# Patient Record
Sex: FEMALE | Race: WHITE | Employment: FULL TIME | URBAN - METROPOLITAN AREA
[De-identification: names, ages, dates, MRNs, and addresses within clinical notes are randomized per-mention and may not be internally consistent; named-entity substitution may affect disease eponyms.]

---

## 2020-08-26 ENCOUNTER — EMPLOYEE WELLNESS (OUTPATIENT)
Dept: OTHER | Age: 30
End: 2020-08-26

## 2020-08-26 LAB
CHOLESTEROL, TOTAL: 215 MG/DL (ref 0–199)
GLUCOSE BLD-MCNC: 76 MG/DL (ref 74–107)
HDLC SERPL-MCNC: 62 MG/DL
LDL CHOLESTEROL CALCULATED: 141 MG/DL (ref 0–99)
TRIGL SERPL-MCNC: 61 MG/DL (ref 0–149)

## 2020-10-19 VITALS — WEIGHT: 157 LBS

## 2021-01-04 ENCOUNTER — OFFICE VISIT (OUTPATIENT)
Dept: FAMILY MEDICINE CLINIC | Age: 31
End: 2021-01-04
Payer: COMMERCIAL

## 2021-01-04 VITALS
RESPIRATION RATE: 16 BRPM | HEIGHT: 65 IN | DIASTOLIC BLOOD PRESSURE: 72 MMHG | TEMPERATURE: 98.4 F | SYSTOLIC BLOOD PRESSURE: 114 MMHG | WEIGHT: 166.8 LBS | BODY MASS INDEX: 27.79 KG/M2 | HEART RATE: 81 BPM | OXYGEN SATURATION: 98 %

## 2021-01-04 DIAGNOSIS — F32.A DEPRESSION, UNSPECIFIED DEPRESSION TYPE: Primary | ICD-10-CM

## 2021-01-04 DIAGNOSIS — M25.541 ARTHRALGIA OF BOTH HANDS: ICD-10-CM

## 2021-01-04 DIAGNOSIS — M25.542 ARTHRALGIA OF BOTH HANDS: ICD-10-CM

## 2021-01-04 PROBLEM — F41.1 GAD (GENERALIZED ANXIETY DISORDER): Status: ACTIVE | Noted: 2019-07-17

## 2021-01-04 PROBLEM — Z91.040 LATEX ALLERGY STATUS: Status: ACTIVE | Noted: 2020-04-28

## 2021-01-04 PROCEDURE — 99203 OFFICE O/P NEW LOW 30 MIN: CPT | Performed by: INTERNAL MEDICINE

## 2021-01-04 RX ORDER — DESVENLAFAXINE 50 MG/1
50 TABLET, EXTENDED RELEASE ORAL DAILY
COMMUNITY
Start: 2020-05-06 | End: 2021-01-04 | Stop reason: SDUPTHER

## 2021-01-04 RX ORDER — ALBUTEROL SULFATE 90 UG/1
2 AEROSOL, METERED RESPIRATORY (INHALATION) EVERY 6 HOURS PRN
COMMUNITY
End: 2022-01-21

## 2021-01-04 RX ORDER — DESVENLAFAXINE 50 MG/1
50 TABLET, EXTENDED RELEASE ORAL DAILY
Qty: 30 TABLET | Refills: 11 | Status: SHIPPED
Start: 2021-01-04 | End: 2022-01-13

## 2021-01-04 ASSESSMENT — PATIENT HEALTH QUESTIONNAIRE - PHQ9
SUM OF ALL RESPONSES TO PHQ QUESTIONS 1-9: 0
SUM OF ALL RESPONSES TO PHQ9 QUESTIONS 1 & 2: 0
SUM OF ALL RESPONSES TO PHQ QUESTIONS 1-9: 0
2. FEELING DOWN, DEPRESSED OR HOPELESS: 0
1. LITTLE INTEREST OR PLEASURE IN DOING THINGS: 0

## 2021-01-04 NOTE — PROGRESS NOTES
[facility]  [address] [place] [id number]  Dept: [phone]  Dept Fax: [phone]     NAME: Markel Bryan        :  1990        MRN:  <[id number]>    Chief Complaint   Patient presents with   Radha Teixeira New Patient     needing new Rx for Pristiq, referral for rheumatologist       Subjective     History of Present Illness  Markel Bryan is a 35 y.o. female who presents today to [facility] care. Patient reports having bilateral 1st -4th digit MCP pain and swelling intermittently. She also describes plaque like lesion to the palmar surface of the hand at the MCP as well. Currently she is not having any of these symptoms but she is concerned for possible psoriatic arthritis as this runs in her family in bother her mother and sister. Review of Systems  Please see HPI above. All bolded are positive. Gen: fever, chills, fatigue, weakness, diaphoresis, or unintentional weight change  Head: headache, vision change, hearing loss  Chest: chest pain/heaviness, palpitations  Lungs: shortness of breath, wheezing, coughing, hemoptysis  Abdomen: abdominal pain, nausea, vomiting, diarrhea, constipation, melena, hematochezia, hematemesis, or loss of appetite  Extremities: lower extremity edema, myalgias, arthralgias  Urinary: dysuria, hematuria, weak flow, or increase in frequency  Neurologic: lightheadedness, dizziness, confusion, syncope  Endocrine: polydipsia, polyuria, heat or cold intolerance  Psychiatric: depression, suicidal ideation, or anxiety  Derm: Rashes, ulcers, burns    Past Medical Hx:  No past medical history on file. Past Surgical Hx:  No past surgical history on file. Family Hx:  No family history on file.     Social Hx:  Social History     Tobacco Use    Smoking status: Never Smoker    Smokeless tobacco: Never Used   Substance Use Topics    Alcohol use: Not Currently       Home Medications:  Current Outpatient Medications Medication Sig Dispense Refill    albuterol sulfate  (90 Base) MCG/ACT inhaler Inhale 2 puffs into the lungs every 6 hours as needed      desvenlafaxine succinate (PRISTIQ) 50 MG TB24 extended release tablet Take 1 tablet by mouth daily 30 tablet 11     No current facility-administered medications for this visit. Allergies: Allergies   Allergen Reactions    Latex Hives and Nausea And Vomiting    Aspirin        Objective     Vitals:    01/04/21 1307   BP: 114/72   Pulse: 81   Resp: 16   Temp: 98.4 °F (36.9 °C)   SpO2: 98%       Physical Exam  General: Awake, alert, and oriented to person, place, time, and purpose, appears stated age and cooperative, No acute distress  Head: Normocephalic, atraumatic  Eyes: conjunctivae/corneas clear. PERRL, EOM's intact. Mouth: Mucous membranes moist with no pharyngeal exudate or erythema  Neck: no JVD, no adenopathy, no carotid bruit and supple, symmetrical, trachea midline  Back: symmetric, ROM normal, No CVA tenderness. Lungs: clear to auscultation bilaterally without wheezes, rales, or rhonchi  Heart: regular rate and rhythm, S1, S2 normal, no murmur, click, rub or gallop  Abdomen: soft, non-tender; bowel sounds normal; no masses,  no organomegaly  Extremities: atraumatic, no cyanosis, no edema, 2+ pulses palpated in all 4 extremities, to psoriasis plaque present, no tender or boggy joints present. Skin: color, texture, turgor within normal limits. No rashes or lesions or normal  Neurologic:speech appropriate, moves all 4 extremities, normal muscle strength and tone, CN 2-12 grossly intact    Labs:  No results found for: WBC, HGB, HCT, PLT, NA, K, CL, CREATININE, BUN, CO2, GLUCOSE, ALT, AST, INR  No results found for: TSH  No results found for: TRIG  No results found for: HDL  No results found for: LDLCALC  No results found for: LABA1C  No results found for: INR, PROTIME   *All recent labs were reviewed.  Please see electronic chart for a more comprehensive set of labs    Radiology:  No results found. Assessment and Plan     Patient is a 35 y.o. female who presented to the office to establish care. Full problem list is as follows: There is no problem list on file for this patient. Marva Thompson was seen today for new patient. Diagnoses and all orders for this visit:    Depression, unspecified depression type  -     desvenlafaxine succinate (PRISTIQ) 50 MG TB24 extended release tablet; Take 1 tablet by mouth daily    Arthralgia of both hands  -     C-Reactive Protein; Future  -     Rheumatoid Factor; Future  -     Cyclic Citrul Peptide Antibody, IgG; Future  -     C3 Complement; Future  -     C4 Complement; Future  -     Anti-DNA Antibody, Double-Stranded; Future  -     AGNIESZKA; Future  -     SEDIMENTATION RATE; Future        Educational materials and/or home exercises printed for patient's review and were included in patient instructions on his/her After Visit Summary and given to patient at the end of visit. Counseled regarding above diagnosis, including possible risks and complications,  especially if left uncontrolled. Counseled regarding the possible side effects, risks, benefits and alternatives to treatment; patient and/or guardian verbalizes understanding, agrees,feels comfortable with and wishes to proceed with above treatment plan. Advised patient to call Mariel Simmonds new medication issues, and read all Rx info from pharmacy to assure aware of all possible risks and side effects of any medication before taking. Reviewed age and gender appropriate health screening exams and vaccinations. Advised patient regarding importance of keeping up with recommended health maintenance and to schedule as soon as possible if overdue, as this is important in assessing for undiagnosed pathology, especially cancer, as well as protecting against potentially harmful/life threatening disease.        Patient verbalizes understanding and agrees with above counseling, assessment and plan. All questions answered.     Cameron Hudson DO   1/4/2021  2:33 PM

## 2021-09-15 LAB
CHOLESTEROL, TOTAL: 242 MG/DL (ref 0–199)
GLUCOSE BLD-MCNC: 83 MG/DL (ref 74–107)
HDLC SERPL-MCNC: 52 MG/DL
LDL CHOLESTEROL CALCULATED: 173 MG/DL (ref 0–99)
TRIGL SERPL-MCNC: 86 MG/DL (ref 0–149)

## 2022-01-12 DIAGNOSIS — F32.A DEPRESSION, UNSPECIFIED DEPRESSION TYPE: ICD-10-CM

## 2022-01-12 NOTE — TELEPHONE ENCOUNTER
Last Appointment:  1/4/2021  Future Appointments   Date Time Provider [place] Hart   1/21/2022 11:00 AM Jennifer Anderson  [address]

## 2022-01-13 RX ORDER — DESVENLAFAXINE 50 MG/1
TABLET, EXTENDED RELEASE ORAL
Qty: 30 TABLET | Refills: 11 | Status: SHIPPED
Start: 2022-01-13 | End: 2022-01-21 | Stop reason: SDUPTHER

## 2022-01-21 ENCOUNTER — OFFICE VISIT (OUTPATIENT)
Dept: FAMILY MEDICINE CLINIC | Age: 32
End: 2022-01-21
Payer: COMMERCIAL

## 2022-01-21 VITALS
RESPIRATION RATE: 18 BRPM | HEART RATE: 69 BPM | BODY MASS INDEX: 28.66 KG/M2 | HEIGHT: 65 IN | TEMPERATURE: 97.7 F | SYSTOLIC BLOOD PRESSURE: 110 MMHG | WEIGHT: 172 LBS | OXYGEN SATURATION: 99 % | DIASTOLIC BLOOD PRESSURE: 66 MMHG

## 2022-01-21 DIAGNOSIS — F32.A DEPRESSION, UNSPECIFIED DEPRESSION TYPE: Primary | ICD-10-CM

## 2022-01-21 DIAGNOSIS — J45.990 EXERCISE-INDUCED ASTHMA: ICD-10-CM

## 2022-01-21 DIAGNOSIS — L98.9 FOOT LESION: ICD-10-CM

## 2022-01-21 DIAGNOSIS — Z00.00 ENCOUNTER FOR WELL ADULT EXAM WITHOUT ABNORMAL FINDINGS: ICD-10-CM

## 2022-01-21 PROCEDURE — 99395 PREV VISIT EST AGE 18-39: CPT | Performed by: INTERNAL MEDICINE

## 2022-01-21 RX ORDER — ALBUTEROL SULFATE 90 UG/1
2 AEROSOL, METERED RESPIRATORY (INHALATION) EVERY 6 HOURS PRN
Qty: 1 EACH | Refills: 5 | Status: SHIPPED | OUTPATIENT
Start: 2022-01-21

## 2022-01-21 RX ORDER — ALBUTEROL SULFATE 90 UG/1
2 AEROSOL, METERED RESPIRATORY (INHALATION) EVERY 6 HOURS PRN
Qty: 18 G | Refills: 3 | Status: CANCELLED | OUTPATIENT
Start: 2022-01-21

## 2022-01-21 RX ORDER — DESVENLAFAXINE 50 MG/1
TABLET, EXTENDED RELEASE ORAL
Qty: 30 TABLET | Refills: 11 | Status: SHIPPED | OUTPATIENT
Start: 2022-01-21

## 2022-01-21 SDOH — ECONOMIC STABILITY: FOOD INSECURITY: WITHIN THE PAST 12 MONTHS, THE FOOD YOU BOUGHT JUST DIDN'T LAST AND YOU DIDN'T HAVE MONEY TO GET MORE.: NEVER TRUE

## 2022-01-21 SDOH — ECONOMIC STABILITY: FOOD INSECURITY: WITHIN THE PAST 12 MONTHS, YOU WORRIED THAT YOUR FOOD WOULD RUN OUT BEFORE YOU GOT MONEY TO BUY MORE.: NEVER TRUE

## 2022-01-21 ASSESSMENT — PATIENT HEALTH QUESTIONNAIRE - PHQ9
10. IF YOU CHECKED OFF ANY PROBLEMS, HOW DIFFICULT HAVE THESE PROBLEMS MADE IT FOR YOU TO DO YOUR WORK, TAKE CARE OF THINGS AT HOME, OR GET ALONG WITH OTHER PEOPLE: 0
SUM OF ALL RESPONSES TO PHQ9 QUESTIONS 1 & 2: 0
SUM OF ALL RESPONSES TO PHQ QUESTIONS 1-9: 0
6. FEELING BAD ABOUT YOURSELF - OR THAT YOU ARE A FAILURE OR HAVE LET YOURSELF OR YOUR FAMILY DOWN: 0
5. POOR APPETITE OR OVEREATING: 0
3. TROUBLE FALLING OR STAYING ASLEEP: 0
9. THOUGHTS THAT YOU WOULD BE BETTER OFF DEAD, OR OF HURTING YOURSELF: 0
2. FEELING DOWN, DEPRESSED OR HOPELESS: 0
SUM OF ALL RESPONSES TO PHQ QUESTIONS 1-9: 0
4. FEELING TIRED OR HAVING LITTLE ENERGY: 0
SUM OF ALL RESPONSES TO PHQ QUESTIONS 1-9: 0
SUM OF ALL RESPONSES TO PHQ QUESTIONS 1-9: 0
7. TROUBLE CONCENTRATING ON THINGS, SUCH AS READING THE NEWSPAPER OR WATCHING TELEVISION: 0
8. MOVING OR SPEAKING SO SLOWLY THAT OTHER PEOPLE COULD HAVE NOTICED. OR THE OPPOSITE, BEING SO FIGETY OR RESTLESS THAT YOU HAVE BEEN MOVING AROUND A LOT MORE THAN USUAL: 0
1. LITTLE INTEREST OR PLEASURE IN DOING THINGS: 0

## 2022-01-21 ASSESSMENT — SOCIAL DETERMINANTS OF HEALTH (SDOH): HOW HARD IS IT FOR YOU TO PAY FOR THE VERY BASICS LIKE FOOD, HOUSING, MEDICAL CARE, AND HEATING?: NOT HARD AT ALL

## 2022-01-21 NOTE — PATIENT INSTRUCTIONS
Well Visit, Ages 25 to 48: Care Instructions  Overview     Well visits can help you stay healthy. Your doctor has checked your overall health and may have suggested ways to take good care of yourself. Your doctor also may have recommended tests. At home, you can help prevent illness with healthy eating, regular exercise, and other steps. Follow-up care is a key part of your treatment and safety. Be sure to make and go to all appointments, and call your doctor if you are having problems. It's also a good idea to know your test results and keep a list of the medicines you take. How can you care for yourself at home? · Get screening tests that you and your doctor decide on. Screening helps find diseases before any symptoms appear. · Eat healthy foods. Choose fruits, vegetables, whole grains, protein, and low-fat dairy foods. Limit fat, especially saturated fat. Reduce salt in your diet. · Limit alcohol. If you are a man, have no more than 2 drinks a day or 14 drinks a week. If you are a woman, have no more than 1 drink a day or 7 drinks a week. · Get at least 30 minutes of physical activity on most days of the week. Walking is a good choice. You also may want to do other activities, such as running, swimming, cycling, or playing tennis or team sports. Discuss any changes in your exercise program with your doctor. · Reach and stay at a healthy weight. This will lower your risk for many problems, such as obesity, diabetes, heart disease, and high blood pressure. · Do not smoke or allow others to smoke around you. If you need help quitting, talk to your doctor about stop-smoking programs and medicines. These can increase your chances of quitting for good. · Care for your mental health. It is easy to get weighed down by worry and stress. Learn strategies to manage stress, like deep breathing and mindfulness, and stay connected with your family and community.  If you find you often feel sad or hopeless, talk with your doctor. Treatment can help. · Talk to your doctor about whether you have any risk factors for sexually transmitted infections (STIs). You can help prevent STIs if you wait to have sex with a new partner (or partners) until you've each been tested for STIs. It also helps if you use condoms (male or female condoms) and if you limit your sex partners to one person who only has sex with you. Vaccines are available for some STIs, such as HPV. · Use birth control if it's important to you to prevent pregnancy. Talk with your doctor about the choices available and what might be best for you. · If you think you may have a problem with alcohol or drug use, talk to your doctor. This includes prescription medicines (such as amphetamines and opioids) and illegal drugs (such as cocaine and methamphetamine). Your doctor can help you figure out what type of treatment is best for you. · Protect your skin from too much sun. When you're outdoors from 10 a.m. to 4 p.m., stay in the shade or cover up with clothing and a hat with a wide brim. Wear sunglasses that block UV rays. Even when it's cloudy, put broad-spectrum sunscreen (SPF 30 or higher) on any exposed skin. · See a dentist one or two times a year for checkups and to have your teeth cleaned. · Wear a seat belt in the car. When should you call for help? Watch closely for changes in your health, and be sure to contact your doctor if you have any problems or symptoms that concern you. Where can you learn more? Go to https://[employer]. org and sign in to your [employer] account. Enter P072 in the [facility] box to learn more about \"Well Visit, Ages 25 to 48: Care Instructions. \"     If you do not have an account, please click on the \"Sign Up Now\" link. Current as of: October 6, 2021               Content Version: 13.1  © 5859-8138 Healthwise, Incorporated. Care instructions adapted under license by [facility] ([facility]).  If you have questions about a medical condition or this instruction, always ask your healthcare professional. [place] [address] any warranty or liability for your use of this information.

## 2022-01-21 NOTE — PROGRESS NOTES
Well Adult Note  Name: Anders Paulino Date: 2022   MRN: [id number] Sex: Female   Age: 28 y.o. Ethnicity: Non- / Non    : 1990 Race: White (non-)      Aye Agarwal is here for well adult exam.  History:  Patient is doing well on her current medications. She takes Prestiq daily and is well controlled on that. She uses the albuterol inhaler only occasionally, if exercise induces her asthma. She does have a wart on the bottom of her left foot that has been present for several years. It has been frozen, burned, and shaved off in the past with out permanent relief, as is always comes back. She denies any redness or irritation, but states that it is painful to walk on. Review of Systems  Please see HPI above. All bolded are positive. Gen: fever, chills, fatigue, weakness, diaphoresis, or unintentional weight change  Head: headache, vision change, hearing loss  Chest: chest pain/heaviness, palpitations  Lungs: shortness of breath, wheezing, coughing, hemoptysis  Abdomen: abdominal pain, nausea, vomiting, diarrhea, constipation, melena, hematochezia, hematemesis, or loss of appetite  Extremities: lower extremity edema, myalgias, arthralgias  Urinary: dysuria, hematuria, weak flow, or increase in frequency  Neurologic: lightheadedness, dizziness, confusion, syncope  Endocrine: polydipsia, polyuria, heat or cold intolerance  Psychiatric: depression, suicidal ideation, or anxiety  Derm: Rashes, ulcers, burns        Allergies   Allergen Reactions    Latex Hives and Nausea And Vomiting    Aspirin          Prior to Visit Medications    Medication Sig Taking?  Authorizing Provider   desvenlafaxine succinate (PRISTIQ) 50 MG TB24 extended release tablet TAKE ONE TABLET BY MOUTH DAILY Yes Chary Aceves, DO   albuterol sulfate HFA (PROAIR HFA) 108 (90 Base) MCG/ACT inhaler Inhale 2 puffs into the lungs every 6 hours as needed for Wheezing or Shortness of Breath Yes Buster Fernandes DO       No past medical history on file. No past surgical history on file. No family history on file. Social History     Tobacco Use    Smoking status: Never Smoker    Smokeless tobacco: Never Used   Substance Use Topics    Alcohol use: Not Currently    Drug use: Never       Objective   /66   Pulse 69   Temp 97.7 °F (36.5 °C) (Temporal)   Resp 18   Ht 5' 5\" (1.651 m)   Wt 172 lb (78 kg)   SpO2 99%   BMI 28.62 kg/m²   Wt Readings from Last 3 Encounters:   01/21/22 172 lb (78 kg)   01/04/21 166 lb 12.8 oz (75.7 kg)   08/26/20 157 lb (71.2 kg)       Physical Exam:  General: Awake, alert, and oriented to person, place, time, and purpose, appears stated age and cooperative, No acute distress  Head: Normocephalic, atraumatic  Eyes: conjunctivae/corneas clear, EOM's intact. Mouth: Mucous membranes moist with no pharyngeal exudate or erythema  Neck: no JVD, no adenopathy, no carotid bruit, supple, symmetrical, trachea midline  Back: symmetric, ROM normal, No CVA tenderness. Lungs: clear to auscultation bilaterally without wheezes, rales, or rhonchi  Heart: regular rate and rhythm, S1, S2 normal, no murmur, click, rub or gallop  Abdomen: soft, non-tender; bowel sounds normal; no masses,  no organomegaly  Extremities: atraumatic, no cyanosis, no edema, 2+ pulses palpated in all 4 extremities  Skin: color, texture, turgor within normal limits. No rashes or lesions or normal  Wart with surrounding calloused area on the bottom of her left foot. Neurologic: speech appropriate, moves all 4 extremities, normal muscle strength and tone, CN 2-12 grossly intact      Assessment   Plan   1. Depression, unspecified depression type  -     desvenlafaxine succinate (PRISTIQ) 50 MG TB24 extended release tablet; TAKE ONE TABLET BY MOUTH DAILY, Disp-30 tablet, R-11Normal  2. Exercise-induced asthma  -     albuterol sulfate HFA (PROAIR HFA) 108 (90 Base) MCG/ACT inhaler;  Inhale 2 puffs into the lungs every 6 hours as needed for Wheezing or Shortness of Breath, Disp-1 each, R-5Normal  3. Foot lesion  -     Sadi Galindo, [place], Podiatry, [place]  4. Encounter for well adult exam without abnormal findings         Personalized Preventive Plan   Current Health Maintenance Status  Immunization History   Administered Date(s) Administered    COVID-19, Cleve Yanique, Primary or Immunocompromised, PF, 100mcg/0.5mL 02/26/2021, 03/31/2021    Influenza Virus Vaccine 10/27/2017, 10/04/2019, 11/18/2021    Influenza, Intradermal, Quadrivalent, Preservative Free 10/05/2020    MMR 12/02/2002, 03/14/2019    Tdap (Boostrix, Adacel) 03/14/2019    Varicella (Varivax) 03/25/2019        Health Maintenance   Topic Date Due    Hepatitis C screen  Never done    Depression Monitoring  Never done    HIV screen  Never done    Cervical cancer screen  Never done    COVID-19 Vaccine (3 - Booster for Moderna series) 09/30/2021    DTaP/Tdap/Td vaccine (2 - Td or Tdap) 03/14/2029    Flu vaccine  Completed    Hepatitis A vaccine  Aged Out    Hepatitis B vaccine  Aged Out    Hib vaccine  Aged Out    Meningococcal (ACWY) vaccine  Aged Out    Pneumococcal 0-64 years Vaccine  Aged Out    Varicella vaccine  Discontinued     Recommendations for Urlist Due: see orders and patient instructions/AVS.    No follow-ups on file.     Trudy Read, DO

## 2022-02-10 ENCOUNTER — OFFICE VISIT (OUTPATIENT)
Dept: PODIATRY | Age: 32
End: 2022-02-10
Payer: COMMERCIAL

## 2022-02-10 VITALS — WEIGHT: 165 LBS | HEIGHT: 65 IN | BODY MASS INDEX: 27.49 KG/M2

## 2022-02-10 DIAGNOSIS — D23.72 BENIGN NEOPLASM OF SKIN OF LEFT FOOT: Primary | ICD-10-CM

## 2022-02-10 DIAGNOSIS — M79.672 PAIN IN LEFT FOOT: ICD-10-CM

## 2022-02-10 DIAGNOSIS — R26.2 DIFFICULTY WALKING: ICD-10-CM

## 2022-02-10 PROCEDURE — 99203 OFFICE O/P NEW LOW 30 MIN: CPT | Performed by: PODIATRIST

## 2022-02-10 NOTE — PROGRESS NOTES
Patient is in today for evaluation of possibly wart to the left foot. Patient has tried to have it removed in the past, but it returned.  pcp is Lee Lew DO  Last ov 1/21/22

## 2022-02-10 NOTE — PROGRESS NOTES
2/10/22     Davide Patel    : 1990 Sex: female   Age: 28 y.o. Patient was referred by: Micaela Betts DO  Patient's PCP/Provider is:  Micaela Betts DO    Subjective:    Patient is seen today for evaluation regarding painful neoplasm left foot. Chief Complaint   Patient presents with    Foot Pain     left foot        HPI: Patient has had multiple treatments performed to the neoplasm site over the last several years. She has had cold therapy, cauterization, and topical treatments without improvement noted. Patient presented today to discuss further treatment options available. No other additional abnormalities noted. ROS:  Const: Positives and pertinent negatives as per HPI. Musculo: Denies symptoms other than stated above. Neuro: Denies symptoms other than stated above. Skin: Denies symptoms other than stated above. Current Medications:    Current Outpatient Medications:     desvenlafaxine succinate (PRISTIQ) 50 MG TB24 extended release tablet, TAKE ONE TABLET BY MOUTH DAILY, Disp: 30 tablet, Rfl: 11    albuterol sulfate HFA (PROAIR HFA) 108 (90 Base) MCG/ACT inhaler, Inhale 2 puffs into the lungs every 6 hours as needed for Wheezing or Shortness of Breath, Disp: 1 each, Rfl: 5    Allergies: Allergies   Allergen Reactions    Latex Hives and Nausea And Vomiting    Aspirin        Vitals:    02/10/22 1017   Weight: 165 lb (74.8 kg)   Height: 5' 5\" (1.651 m)        No past medical history on file. No family history on file. No past surgical history on file. Social History     Tobacco Use    Smoking status: Never Smoker    Smokeless tobacco: Never Used   Substance Use Topics    Alcohol use: Not Currently    Drug use: Never           Diagnostic studies:    No results found. Procedures:    None    Exam:  VASCULAR: Pedal pulses palpable left foot. Capillary refill time brisk digits 1 through 5 left foot.   NEUROLOGICAL: Epicritic sensations intact left lower

## 2022-02-10 NOTE — LETTER
[address] [place] [address] Deena Herman  Phone: [phone]  Fax: [phone]    Ciara Irvin  <[id number]>   1990  2/10/2022      Dear Preston Kirkland,    I would like to thank you for the kind referral of Batool Irvin. She presented to the office today for evaluation regarding chronic neoplasm left foot. Patient has had multiple treatments rendered in the recent past without improvement noted. We did discuss surgical excision of the neoplasm on an outpatient basis which patient was in favor of at this time. We will proceed with outpatient surgery in the near future. We will have continued follow-up until issues are resolved. If you should have any questions concerning her visit today, please do not hesitate to contact me.     Sincerely,    Hilary Fuller DPM

## 2022-04-27 ENCOUNTER — OFFICE VISIT (OUTPATIENT)
Dept: PODIATRY | Age: 32
End: 2022-04-27
Payer: COMMERCIAL

## 2022-04-27 VITALS — HEIGHT: 65 IN | WEIGHT: 165 LBS | BODY MASS INDEX: 27.49 KG/M2

## 2022-04-27 DIAGNOSIS — M79.672 PAIN IN LEFT FOOT: ICD-10-CM

## 2022-04-27 DIAGNOSIS — D23.72 BENIGN NEOPLASM OF SKIN OF LEFT FOOT: Primary | ICD-10-CM

## 2022-04-27 DIAGNOSIS — R26.2 DIFFICULTY WALKING: ICD-10-CM

## 2022-04-27 PROCEDURE — 99213 OFFICE O/P EST LOW 20 MIN: CPT | Performed by: PODIATRIST

## 2022-04-27 NOTE — PROGRESS NOTES
Patient is in today to discuss surgery for benign neoplasm left foot.  pcp is Gabino Lucero DO  Last ov 1/21/22

## 2022-04-27 NOTE — PROGRESS NOTES
22     Yoanna Reina    : 1990   Sex: female    Age: 33 y.o. Patient's PCP/Provider is:  Bry Ng DO    Subjective:  Patient is seen today for follow-up regarding continued evaluation regarding painful neoplasm left forefoot area. Patient presents today to discuss outpatient surgical intervention. She denies any additional issues at this time. Chief Complaint   Patient presents with    Follow-up     discuss surgery        ROS:  Const: Positives and pertinent negatives as per HPI. Musculo: Denies symptoms other than stated above. Neuro: Denies symptoms other than stated above. Skin: Denies symptoms other than stated above. Current Medications:    Current Outpatient Medications:     desvenlafaxine succinate (PRISTIQ) 50 MG TB24 extended release tablet, TAKE ONE TABLET BY MOUTH DAILY, Disp: 30 tablet, Rfl: 11    albuterol sulfate HFA (PROAIR HFA) 108 (90 Base) MCG/ACT inhaler, Inhale 2 puffs into the lungs every 6 hours as needed for Wheezing or Shortness of Breath, Disp: 1 each, Rfl: 5    Allergies: Allergies   Allergen Reactions    Latex Hives and Nausea And Vomiting    Aspirin        Vitals:    22 1604   Weight: 165 lb (74.8 kg)   Height: 5' 5\" (1.651 m)       Exam:  Neurovascular status unchanged. Neoplasm noted plantar left fourth MTPJ area measuring approximately 0.7 cm in diameter. Tenderness noted to direct palpation. No signs of infection noted left forefoot area. No maceration webspaces noted left foot. Adequate range of motion digital regions left foot. Diagnostic Studies:     No results found. Procedures:    None    Plan Per Assessment  Keke Barcenas was seen today for follow-up. 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 Evaluation and management  2. We did discuss outpatient surgical intervention with patient in detail today.   We will proceed with contain grammatical errors.

## 2022-04-28 ENCOUNTER — TELEPHONE (OUTPATIENT)
Dept: PODIATRY | Age: 32
End: 2022-04-28

## 2022-04-28 NOTE — TELEPHONE ENCOUNTER
Prior Authorization Form:      DEMOGRAPHICS:                     Patient Name:  Jose Samuels  Patient :  1990            Insurance:  Payor: Madyson Monsivaisvi RODRIGUEZadryanpoli 150 / Plan: Madyson Monsivaisjocelynsuresh MICHAELadryanpoli 150 - [place] PPO / Product Type: *No Product type* /   Insurance ID Number:    Payor/Plan Subscr  Sex Relation Sub. Ins. ID Effective Group Num   1.  [facility] 1990 Female Self [id number]                                    [id number]         DIAGNOSIS & PROCEDURE:                       Procedure/Operation: excision benign neoplasm left foot           CPT Code: 22571    Diagnosis:  Benign neoplasm left foot, pain in left foot    ICD10 Code: D23.72, M79.672     Location:  [facility]    Surgeon:  Dr. Jon Moore INFORMATION:                          Date: 22    Time: TBD              Anesthesia:  MAC/TIVA                                                       Status:  Outpatient        Special Comments:  None       Electronically signed by Bella Alvarez LPN on  at 5:58 AM

## 2022-05-11 ENCOUNTER — OFFICE VISIT (OUTPATIENT)
Dept: FAMILY MEDICINE CLINIC | Age: 32
End: 2022-05-11
Payer: COMMERCIAL

## 2022-05-11 VITALS
BODY MASS INDEX: 28.16 KG/M2 | RESPIRATION RATE: 18 BRPM | SYSTOLIC BLOOD PRESSURE: 110 MMHG | TEMPERATURE: 98.5 F | OXYGEN SATURATION: 98 % | WEIGHT: 169 LBS | HEIGHT: 65 IN | DIASTOLIC BLOOD PRESSURE: 66 MMHG | HEART RATE: 96 BPM

## 2022-05-11 DIAGNOSIS — F32.A DEPRESSION, UNSPECIFIED DEPRESSION TYPE: ICD-10-CM

## 2022-05-11 DIAGNOSIS — L98.9 FOOT LESION: ICD-10-CM

## 2022-05-11 DIAGNOSIS — Z01.818 PREOP EXAMINATION: Primary | ICD-10-CM

## 2022-05-11 DIAGNOSIS — J45.990 EXERCISE-INDUCED ASTHMA: ICD-10-CM

## 2022-05-11 PROCEDURE — 99213 OFFICE O/P EST LOW 20 MIN: CPT | Performed by: INTERNAL MEDICINE

## 2022-05-11 NOTE — PROGRESS NOTES
[facility]  [address] [place] [address]  [place] [address]  Dept: [phone]  Dept Fax: [phone]     NAME: Cherrie Muir        :  1990        MRN:  [id number]    Chief Complaint   Patient presents with    Pre-op Exam     Removal of wart Lt foot scheduled 22 with Dr. Missy Mcdonald / under twighlight       Subjective     History of Present Illness  Cherrie Muir is a 33 y.o. female who presents today for preop clearance. She is scheduled for the removal of a wart from her left foot on 2022 with Dr. Missy Mcdonald.  She will be under twilight anesthesia. Review of Systems  Please see HPI above. All bolded are positive. Gen: fever, chills, fatigue, weakness, diaphoresis, unintentional weight change  Head: headache, vision change, hearing loss  Chest: chest pain/heaviness, palpitations  Lungs: shortness of breath, wheezing, coughing, hemoptysis  Abdomen: abdominal pain, nausea, vomiting, diarrhea, constipation, melena, hematochezia, hematemesis, loss of appetite  Extremities: lower extremity edema, myalgias, arthralgias  Urinary: dysuria, hematuria, weak flow, increase in frequency  Neurologic: lightheadedness, dizziness, confusion, syncope  Endocrine: polydipsia, polyuria, heat or cold intolerance  Psychiatric: depression, suicidal ideation, anxiety  Derm: Rashes, ulcers, burns    Past Medical Hx:  No past medical history on file. Past Surgical Hx:  No past surgical history on file. Family Hx:  No family history on file.     Social Hx:  Social History     Tobacco Use    Smoking status: Never Smoker    Smokeless tobacco: Never Used   Substance Use Topics    Alcohol use: Not Currently       Home Medications:  Current Outpatient Medications   Medication Sig Dispense Refill    desvenlafaxine succinate (PRISTIQ) 50 MG TB24 extended release tablet TAKE ONE TABLET BY MOUTH DAILY 30 tablet 11    albuterol sulfate HFA (PROAIR HFA) 108 (90 Base) MCG/ACT inhaler Inhale 2 puffs into the lungs every 6 hours as needed for Wheezing or Shortness of Breath 1 each 5     No current facility-administered medications for this visit. Allergies: Allergies   Allergen Reactions    Latex Hives and Nausea And Vomiting    Aspirin        Objective     Vitals:    05/11/22 0926   BP: 110/66   Pulse: 96   Resp: 18   Temp: 98.5 °F (36.9 °C)   TempSrc: Temporal   SpO2: 98%   Weight: 169 lb (76.7 kg)   Height: 5' 5\" (1.651 m)        Physical Exam  General: Awake, alert, and oriented to person, place, time, and purpose, appears stated age and cooperative, No acute distress  Head: Normocephalic, atraumatic  Eyes: conjunctivae/corneas clear, EOMI  Mouth: Mucous membranes moist with no pharyngeal exudate or erythema  Neck: no JVD, no adenopathy, no carotid bruit, supple, symmetrical, trachea midline  Back: symmetric, ROM normal, No CVA tenderness. Lungs: clear to auscultation bilaterally without wheezes, rales, or rhonchi  Heart: regular rate and rhythm, S1, S2 normal, no murmur, click, rub or gallop  Abdomen: soft, non-tender; bowel sounds normal; no masses,  no organomegaly  Extremities: atraumatic, no cyanosis, no edema  Skin: color, texture, turgor within normal limits. No rashes or lesions   Neurologic:speech appropriate, moves all 4 extremities, normal muscle strength and tone, CN 2-12 grossly intact    Labs:  Lab Results   Component Value Date    GLUCOSE 83 09/15/2021     No results found for: TSH  Lab Results   Component Value Date    TRIG 86 09/15/2021    TRIG 61 08/26/2020     Lab Results   Component Value Date    HDL 52 09/15/2021    HDL 62 08/26/2020     Lab Results   Component Value Date    LDLCALC 173 (H) 09/15/2021    LDLCALC 141 (H) 08/26/2020     No results found for: LABA1C  No results found for: INR, PROTIME   *All recent labs were reviewed. Please see electronic chart for a more comprehensive set of labs    Radiology:  No results found.     Assessment and Plan     Patient is a 33 y.o. female who presented to the office for follow up. Full problem list is as follows:  Patient Active Problem List   Diagnosis    Latex allergy status    POLLY (generalized anxiety disorder)    Benign neoplasm of skin of left foot    Pain in left foot    Difficulty walking       Keke Pump was seen today for pre-op exam.    Diagnoses and all orders for this visit:    Preop examination    Exercise-induced asthma    Foot lesion    Depression, unspecified depression type    -All conditions listed above are stable    Patient is medically cleared for surgery at this time. Form was filled out and signed for her and faxed to Dr. Graciela Hauser office. Educational materials and/or home exercises printed for patient's review and were included in patient instructions on his/her After Visit Summary and given to patient at the end of visit. Counseled regarding above diagnosis, including possible risks and complications, especially if left uncontrolled. Counseled regarding the possible side effects, risks, benefits and alternatives to treatment; patient and/or guardian verbalizes understanding, agrees, feels comfortable with and wishes to proceed with above treatment plan. Advised patient to call Thao Slater new medication issues, and read all Rx info from pharmacy to assure aware of all possible risks and side effects of any medication before taking. Patient verbalizes understanding and agrees with above counseling, assessment and plan. All questions answered.     Bry Ng, DO

## 2022-05-18 ENCOUNTER — ANESTHESIA EVENT (OUTPATIENT)
Dept: OPERATING ROOM | Age: 32
End: 2022-05-18
Payer: COMMERCIAL

## 2022-05-18 ENCOUNTER — PREP FOR PROCEDURE (OUTPATIENT)
Dept: PODIATRY | Age: 32
End: 2022-05-18

## 2022-05-18 RX ORDER — SODIUM CHLORIDE 0.9 % (FLUSH) 0.9 %
5-40 SYRINGE (ML) INJECTION PRN
Status: CANCELLED | OUTPATIENT
Start: 2022-05-18

## 2022-05-18 RX ORDER — SODIUM CHLORIDE 9 MG/ML
INJECTION, SOLUTION INTRAVENOUS PRN
Status: CANCELLED | OUTPATIENT
Start: 2022-05-18

## 2022-05-18 RX ORDER — SODIUM CHLORIDE 0.9 % (FLUSH) 0.9 %
5-40 SYRINGE (ML) INJECTION EVERY 12 HOURS SCHEDULED
Status: CANCELLED | OUTPATIENT
Start: 2022-05-18

## 2022-05-18 NOTE — ANESTHESIA PRE PROCEDURE
Department of Anesthesiology  Preprocedure Note       Name:  Coretta Sheehan   Age:  33 y.o.  :  1990                                          MRN:  [id number]         Date:  2022      Surgeon: Johnathan Farley):  Matti Ramsey DPM    Procedure: Procedure(s):  EXCISION BENIGN NEOPLASM LEFT FOOT    Medications prior to admission:   Prior to Admission medications    Medication Sig Start Date End Date Taking? Authorizing Provider   desvenlafaxine succinate (PRISTIQ) 50 MG TB24 extended release tablet TAKE ONE TABLET BY MOUTH DAILY 22   Rinku Virgen DO   albuterol sulfate HFA (PROAIR HFA) 108 (90 Base) MCG/ACT inhaler Inhale 2 puffs into the lungs every 6 hours as needed for Wheezing or Shortness of Breath 22   Rinku Virgen DO       Current medications:    No current facility-administered medications for this encounter. Current Outpatient Medications   Medication Sig Dispense Refill    desvenlafaxine succinate (PRISTIQ) 50 MG TB24 extended release tablet TAKE ONE TABLET BY MOUTH DAILY 30 tablet 11    albuterol sulfate HFA (PROAIR HFA) 108 (90 Base) MCG/ACT inhaler Inhale 2 puffs into the lungs every 6 hours as needed for Wheezing or Shortness of Breath 1 each 5       Allergies:     Allergies   Allergen Reactions    Latex Hives and Nausea And Vomiting    Aspirin      Tachycardia, anxiety       Problem List:    Patient Active Problem List   Diagnosis Code    Latex allergy status Z91.040    POLLY (generalized anxiety disorder) F41.1    Benign neoplasm of skin of left foot D23.72    Pain in left foot M79.672    Difficulty walking R26.2       Past Medical History:        Diagnosis Date    Asthma     exercised induced    COVID-19     Dec 2020       Past Surgical History:        Procedure Laterality Date    TONSILLECTOMY         Social History:    Social History     Tobacco Use    Smoking status: Never Smoker    Smokeless tobacco: Never Used   Substance Use Topics    Alcohol use: Not Currently                                Counseling given: Not Answered      Vital Signs (Current):   Vitals:    05/16/22 1127   Weight: 165 lb (74.8 kg)   Height: 5' 5\" (1.651 m)                                              BP Readings from Last 3 Encounters:   05/11/22 110/66   01/21/22 110/66   01/04/21 114/72       NPO Status:  >8.H                                                                               BMI:   Wt Readings from Last 3 Encounters:   05/11/22 169 lb (76.7 kg)   04/27/22 165 lb (74.8 kg)   02/10/22 165 lb (74.8 kg)     Body mass index is 27.46 kg/m². CBC: No results found for: WBC, RBC, HGB, HCT, MCV, RDW, PLT    CMP:   Lab Results   Component Value Date    GLUCOSE 83 09/15/2021       POC Tests: No results for input(s): POCGLU, POCNA, POCK, POCCL, POCBUN, POCHEMO, POCHCT in the last 72 hours.     Coags: No results found for: PROTIME, INR, APTT    HCG (If Applicable): No results found for: PREGTESTUR, PREGSERUM, HCG, HCGQUANT     ABGs: No results found for: PHART, PO2ART, VDI2WYM, HGI6WJZ, BEART, J1BSLYBA     Type & Screen (If Applicable):  No results found for: LABABO, LABRH    Drug/Infectious Status (If Applicable):  No results found for: HIV, HEPCAB    COVID-19 Screening (If Applicable): No results found for: COVID19        Anesthesia Evaluation  Patient summary reviewed no history of anesthetic complications:   Airway: Mallampati: II  TM distance: >3 FB   Neck ROM: full  Mouth opening: > = 3 FB Dental: normal exam         Pulmonary: breath sounds clear to auscultation  (+) asthma: exercise-induced asthma,     (-) not a current smoker                           Cardiovascular:Negative CV ROS  Exercise tolerance: good (>4 METS),           Rhythm: regular                     PE comment: tachycardia   Neuro/Psych:   (+) psychiatric history:            GI/Hepatic/Renal: Neg GI/Hepatic/Renal ROS            Endo/Other: Negative Endo/Other ROS                    Abdominal: (-) obese       Vascular: Other Findings:           Anesthesia Plan      MAC     ASA 1       Induction: intravenous. continuous noninvasive hemodynamic monitor    Anesthetic plan and risks discussed with patient. Plan discussed with CRNA.     Attending anesthesiologist reviewed and agrees with Preprocedure content    PAT Chart Review:  Chart reviewed per routine on May 18, 2022 at 11:34 AM by Brianna Quiroz MD.  (Final assessment and plan per day of surgery team.)        Brianna Quiroz MD   5/18/2022

## 2022-05-19 ENCOUNTER — HOSPITAL ENCOUNTER (OUTPATIENT)
Age: 32
Setting detail: OUTPATIENT SURGERY
Discharge: HOME | End: 2022-05-19
Attending: PODIATRIST | Admitting: PODIATRIST
Payer: COMMERCIAL

## 2022-05-19 ENCOUNTER — ANESTHESIA (OUTPATIENT)
Dept: OPERATING ROOM | Age: 32
End: 2022-05-19
Payer: COMMERCIAL

## 2022-05-19 VITALS
BODY MASS INDEX: 28.02 KG/M2 | OXYGEN SATURATION: 96 % | SYSTOLIC BLOOD PRESSURE: 105 MMHG | RESPIRATION RATE: 12 BRPM | HEART RATE: 66 BPM | HEIGHT: 65 IN | DIASTOLIC BLOOD PRESSURE: 67 MMHG | WEIGHT: 168.2 LBS | TEMPERATURE: 97.4 F

## 2022-05-19 DIAGNOSIS — M79.672 PAIN IN LEFT FOOT: ICD-10-CM

## 2022-05-19 DIAGNOSIS — D23.72 BENIGN NEOPLASM OF SKIN OF LEFT FOOT: Primary | ICD-10-CM

## 2022-05-19 LAB
HCG, URINE, POC: NEGATIVE
Lab: NORMAL
NEGATIVE QC PASS/FAIL: NORMAL
POSITIVE QC PASS/FAIL: NORMAL

## 2022-05-19 PROCEDURE — 88305 TISSUE EXAM BY PATHOLOGIST: CPT

## 2022-05-19 PROCEDURE — 6360000002 HC RX W HCPCS: Performed by: PODIATRIST

## 2022-05-19 PROCEDURE — 2580000003 HC RX 258: Performed by: ANESTHESIOLOGY

## 2022-05-19 PROCEDURE — 3700000000 HC ANESTHESIA ATTENDED CARE: Performed by: PODIATRIST

## 2022-05-19 PROCEDURE — 6360000002 HC RX W HCPCS

## 2022-05-19 PROCEDURE — 3600000002 HC SURGERY LEVEL 2 BASE: Performed by: PODIATRIST

## 2022-05-19 PROCEDURE — 2500000003 HC RX 250 WO HCPCS: Performed by: PODIATRIST

## 2022-05-19 PROCEDURE — 11421 EXC H-F-NK-SP B9+MARG 0.6-1: CPT | Performed by: PODIATRIST

## 2022-05-19 PROCEDURE — 2500000003 HC RX 250 WO HCPCS

## 2022-05-19 PROCEDURE — 2709999900 HC NON-CHARGEABLE SUPPLY: Performed by: PODIATRIST

## 2022-05-19 PROCEDURE — 3600000012 HC SURGERY LEVEL 2 ADDTL 15MIN: Performed by: PODIATRIST

## 2022-05-19 PROCEDURE — 2580000003 HC RX 258

## 2022-05-19 PROCEDURE — 81025 URINE PREGNANCY TEST: CPT | Performed by: PODIATRIST

## 2022-05-19 PROCEDURE — 7100000010 HC PHASE II RECOVERY - FIRST 15 MIN: Performed by: PODIATRIST

## 2022-05-19 PROCEDURE — 3700000001 HC ADD 15 MINUTES (ANESTHESIA): Performed by: PODIATRIST

## 2022-05-19 PROCEDURE — 7100000011 HC PHASE II RECOVERY - ADDTL 15 MIN: Performed by: PODIATRIST

## 2022-05-19 RX ORDER — KETAMINE HYDROCHLORIDE 50 MG/ML
INJECTION, SOLUTION, CONCENTRATE INTRAMUSCULAR; INTRAVENOUS PRN
Status: DISCONTINUED | OUTPATIENT
Start: 2022-05-19 | End: 2022-05-19 | Stop reason: SDUPTHER

## 2022-05-19 RX ORDER — SODIUM CHLORIDE 0.9 % (FLUSH) 0.9 %
5-40 SYRINGE (ML) INJECTION EVERY 12 HOURS SCHEDULED
Status: DISCONTINUED | OUTPATIENT
Start: 2022-05-19 | End: 2022-05-19 | Stop reason: HOSPADM

## 2022-05-19 RX ORDER — MIDAZOLAM HYDROCHLORIDE 1 MG/ML
INJECTION INTRAMUSCULAR; INTRAVENOUS PRN
Status: DISCONTINUED | OUTPATIENT
Start: 2022-05-19 | End: 2022-05-19 | Stop reason: SDUPTHER

## 2022-05-19 RX ORDER — MEPERIDINE HYDROCHLORIDE 25 MG/ML
12.5 INJECTION INTRAMUSCULAR; INTRAVENOUS; SUBCUTANEOUS EVERY 5 MIN PRN
Status: CANCELLED | OUTPATIENT
Start: 2022-05-19

## 2022-05-19 RX ORDER — SODIUM CHLORIDE 0.9 % (FLUSH) 0.9 %
5-40 SYRINGE (ML) INJECTION EVERY 12 HOURS SCHEDULED
Status: CANCELLED | OUTPATIENT
Start: 2022-05-19

## 2022-05-19 RX ORDER — HYDROCODONE BITARTRATE AND ACETAMINOPHEN 5; 325 MG/1; MG/1
1 TABLET ORAL EVERY 6 HOURS PRN
Status: DISCONTINUED | OUTPATIENT
Start: 2022-05-19 | End: 2022-05-19 | Stop reason: HOSPADM

## 2022-05-19 RX ORDER — SODIUM CHLORIDE, SODIUM LACTATE, POTASSIUM CHLORIDE, CALCIUM CHLORIDE 600; 310; 30; 20 MG/100ML; MG/100ML; MG/100ML; MG/100ML
INJECTION, SOLUTION INTRAVENOUS CONTINUOUS
Status: DISCONTINUED | OUTPATIENT
Start: 2022-05-19 | End: 2022-05-19 | Stop reason: HOSPADM

## 2022-05-19 RX ORDER — SODIUM CHLORIDE, SODIUM LACTATE, POTASSIUM CHLORIDE, CALCIUM CHLORIDE 600; 310; 30; 20 MG/100ML; MG/100ML; MG/100ML; MG/100ML
INJECTION, SOLUTION INTRAVENOUS CONTINUOUS PRN
Status: DISCONTINUED | OUTPATIENT
Start: 2022-05-19 | End: 2022-05-19 | Stop reason: SDUPTHER

## 2022-05-19 RX ORDER — CEFAZOLIN SODIUM 2 G/50ML
2000 SOLUTION INTRAVENOUS
Status: COMPLETED | OUTPATIENT
Start: 2022-05-19 | End: 2022-05-19

## 2022-05-19 RX ORDER — HYDROCODONE BITARTRATE AND ACETAMINOPHEN 5; 325 MG/1; MG/1
1 TABLET ORAL EVERY 6 HOURS PRN
Qty: 12 TABLET | Refills: 0 | Status: SHIPPED | OUTPATIENT
Start: 2022-05-19 | End: 2022-05-22

## 2022-05-19 RX ORDER — BUPIVACAINE HYDROCHLORIDE 5 MG/ML
INJECTION, SOLUTION PERINEURAL PRN
Status: DISCONTINUED | OUTPATIENT
Start: 2022-05-19 | End: 2022-05-19 | Stop reason: ALTCHOICE

## 2022-05-19 RX ORDER — FENTANYL CITRATE 50 UG/ML
50 INJECTION, SOLUTION INTRAMUSCULAR; INTRAVENOUS EVERY 5 MIN PRN
Status: CANCELLED | OUTPATIENT
Start: 2022-05-19

## 2022-05-19 RX ORDER — DIPHENHYDRAMINE HYDROCHLORIDE 50 MG/ML
12.5 INJECTION INTRAMUSCULAR; INTRAVENOUS
Status: CANCELLED | OUTPATIENT
Start: 2022-05-19 | End: 2022-05-19

## 2022-05-19 RX ORDER — MORPHINE SULFATE 2 MG/ML
1 INJECTION, SOLUTION INTRAMUSCULAR; INTRAVENOUS EVERY 5 MIN PRN
Status: CANCELLED | OUTPATIENT
Start: 2022-05-19

## 2022-05-19 RX ORDER — SODIUM CHLORIDE 9 MG/ML
INJECTION, SOLUTION INTRAVENOUS PRN
Status: DISCONTINUED | OUTPATIENT
Start: 2022-05-19 | End: 2022-05-19 | Stop reason: HOSPADM

## 2022-05-19 RX ORDER — SODIUM CHLORIDE 0.9 % (FLUSH) 0.9 %
5-40 SYRINGE (ML) INJECTION PRN
Status: CANCELLED | OUTPATIENT
Start: 2022-05-19

## 2022-05-19 RX ORDER — PROPOFOL 10 MG/ML
INJECTION, EMULSION INTRAVENOUS CONTINUOUS PRN
Status: DISCONTINUED | OUTPATIENT
Start: 2022-05-19 | End: 2022-05-19 | Stop reason: SDUPTHER

## 2022-05-19 RX ORDER — FENTANYL CITRATE 50 UG/ML
INJECTION, SOLUTION INTRAMUSCULAR; INTRAVENOUS PRN
Status: DISCONTINUED | OUTPATIENT
Start: 2022-05-19 | End: 2022-05-19 | Stop reason: SDUPTHER

## 2022-05-19 RX ORDER — SODIUM CHLORIDE 9 MG/ML
INJECTION, SOLUTION INTRAVENOUS PRN
Status: CANCELLED | OUTPATIENT
Start: 2022-05-19

## 2022-05-19 RX ORDER — SODIUM CHLORIDE 0.9 % (FLUSH) 0.9 %
5-40 SYRINGE (ML) INJECTION PRN
Status: DISCONTINUED | OUTPATIENT
Start: 2022-05-19 | End: 2022-05-19 | Stop reason: HOSPADM

## 2022-05-19 RX ADMIN — MIDAZOLAM 2 MG: 1 INJECTION INTRAMUSCULAR; INTRAVENOUS at 12:01

## 2022-05-19 RX ADMIN — SODIUM CHLORIDE, POTASSIUM CHLORIDE, SODIUM LACTATE AND CALCIUM CHLORIDE: 600; 310; 30; 20 INJECTION, SOLUTION INTRAVENOUS at 11:46

## 2022-05-19 RX ADMIN — CEFAZOLIN SODIUM 2000 MG: 2 SOLUTION INTRAVENOUS at 12:02

## 2022-05-19 RX ADMIN — PROPOFOL 100 MCG/KG/MIN: 10 INJECTION, EMULSION INTRAVENOUS at 12:05

## 2022-05-19 RX ADMIN — FENTANYL CITRATE 50 MCG: 50 INJECTION INTRAMUSCULAR; INTRAVENOUS at 12:05

## 2022-05-19 RX ADMIN — SODIUM CHLORIDE, POTASSIUM CHLORIDE, SODIUM LACTATE AND CALCIUM CHLORIDE: 600; 310; 30; 20 INJECTION, SOLUTION INTRAVENOUS at 12:01

## 2022-05-19 RX ADMIN — FENTANYL CITRATE 50 MCG: 50 INJECTION INTRAMUSCULAR; INTRAVENOUS at 12:11

## 2022-05-19 RX ADMIN — KETAMINE HYDROCHLORIDE 40 MG: 50 INJECTION INTRAMUSCULAR; INTRAVENOUS at 12:06

## 2022-05-19 ASSESSMENT — PAIN - FUNCTIONAL ASSESSMENT: PAIN_FUNCTIONAL_ASSESSMENT: 0-10

## 2022-05-19 ASSESSMENT — LIFESTYLE VARIABLES: SMOKING_STATUS: 0

## 2022-05-19 ASSESSMENT — PAIN SCALES - GENERAL: PAINLEVEL_OUTOF10: 0

## 2022-05-19 NOTE — OP NOTE
Operative Note      Patient: Dillon Sloan  YOB: 1990  MRN: [id number]    Date of Procedure: 5/19/2022    Pre-Op Diagnosis: 1. BENIGN NEOPLASM LEFT FOOT  2. PAIN IN LEFT FOOT    Post-Op Diagnosis: Same       Procedure(s):  EXCISION BENIGN NEOPLASM LEFT FOOT    Surgeon(s):  Scar Mckinney DPM    Assistant:   * No surgical staff found *    Anesthesia: Monitor Anesthesia Care    Estimated Blood Loss (mL): Minimal    Complications: None    Specimens:   ID Type Source Tests Collected by Time Destination   A : BENIGN NEOPLASM LEFT FOOT Tissue Tissue SURGICAL PATHOLOGY Scar Mckinney DPM 5/19/2022 1219        Implants:  * No implants in log *      Drains: * No LDAs found *    Findings: Painful neoplasm plantar left foot. Detailed Description of Procedure:   After proper preoperative evaluation, patient was brought back to the operating room placed operating table in the supine position. Monitored anesthesia was administered per anesthesia department. Did receive 2 g intravenous Ancef preoperatively. We did utilize a total of 5 cc of 0.5% Marcaine plain to anesthetize surgical site left foot. Tourniquet was placed around the patient's well-padded left ankle inflated 250 mils mercury then lowered to the surgical field once proper prepping and draping was performed. Attention was directed towards the plantar left fourth MTPJ region where at this time the neoplasm measuring approximately 0.9 cm in diameter was properly circumscribed with a #15 blade. Soft tissue curette was then utilized to remove the neoplasm down to the basement membrane layer. Specimen was sent to pathology for gross examination. Cauterization of the excision base was performed followed by an additional curettage to remove any remaining neoplastic tissue. Final cauterization was performed to allow for proper post procedure hemostasis and surgical site healing. Surgical site was flushed with saline.   Silvadene, Adaptic, and dry padded dressing was then applied left lower extremity. The patient tolerated the procedure and anesthesia well and left the operating room in stable condition. The patient is being transported to the recovery room for post operative management. Patient and/or caregiver was given postoperative home-going instructions and prescriptions to be taken as directed. Patient and/or caregiver were advised to call the office with any questions or concerns prior to their scheduled postoperative appointment.         Electronically signed by Dorice Boas, DPM on 5/19/2022 at 12:31 PM

## 2022-05-19 NOTE — ANESTHESIA POSTPROCEDURE EVALUATION
Department of Anesthesiology  Postprocedure Note    Patient: Regulo Gilliland  MRN: [id number]  YOB: 1990  Date of evaluation: 5/19/2022  Time:  12:46 PM     Procedure Summary     Date: 05/19/22 Room / Location: [address] [place] [address] 03 / [address] [facility], [place]    Anesthesia Start: 9338 Anesthesia Stop: 5009    Procedure: EXCISION BENIGN NEOPLASM LEFT FOOT (Left ) Diagnosis: (BENIGN NEOPLASM LEFT FOOT, PAIN IN LEFT FOOT)    Surgeons: Parris Silver DPM Responsible Provider: Dorian Willoughby MD    Anesthesia Type: MAC ASA Status: 1          Anesthesia Type: No value filed. Jesus Phase I: Jesus Score: 10    Jesus Phase II: Jesus Score: 8    Last vitals: Reviewed and per EMR flowsheets.        Anesthesia Post Evaluation    Patient location during evaluation: PACU  Patient participation: complete - patient participated  Level of consciousness: awake and alert  Airway patency: patent  Nausea & Vomiting: no nausea and no vomiting  Complications: no  Cardiovascular status: hemodynamically stable  Respiratory status: room air and spontaneous ventilation  Hydration status: stable

## 2022-05-19 NOTE — H&P
Update History & Physical     The patient's History and Physical this morning was reviewed with the patient and there were no significant changes. I examined the patient and there were no significant changes from the previous History and Physical.     Plan: The risk, benefits, expected outcome, and alternative to the recommended procedure have been discussed with the patient. Patient understands and wants to proceed with the procedure. The procedure discussed is 1. Excision benign neoplasm left foot.          Electronically signed by Cali Lerma DPM on 5/19/2022 at 11:58 AM

## 2022-05-24 ENCOUNTER — OFFICE VISIT (OUTPATIENT)
Dept: PODIATRY | Age: 32
End: 2022-05-24

## 2022-05-24 VITALS
HEIGHT: 65 IN | SYSTOLIC BLOOD PRESSURE: 119 MMHG | BODY MASS INDEX: 27.49 KG/M2 | WEIGHT: 165 LBS | DIASTOLIC BLOOD PRESSURE: 82 MMHG | HEART RATE: 77 BPM

## 2022-05-24 DIAGNOSIS — D23.72 BENIGN NEOPLASM OF SKIN OF LEFT FOOT: Primary | ICD-10-CM

## 2022-05-24 PROCEDURE — 99024 POSTOP FOLLOW-UP VISIT: CPT | Performed by: PODIATRIST

## 2022-05-24 NOTE — PROGRESS NOTES
22     Lou Gray    : 1990   Sex: female    Age: 33 y.o. Patient's PCP/Provider is:  Chula Rousseau DO    Subjective:  Patient is seen today for follow-up regarding evaluation regarding excision neoplasm left foot. Overall patient is doing well at this time with minimal issues noted. She denies any nausea, vomiting, fever, chills. No other abnormalities noted. Chief Complaint   Patient presents with    Post-Op Check     left foot        ROS:  Const: Positives and pertinent negatives as per HPI. Musculo: Denies symptoms other than stated above. Neuro: Denies symptoms other than stated above. Skin: Denies symptoms other than stated above. Current Medications:    Current Outpatient Medications:     desvenlafaxine succinate (PRISTIQ) 50 MG TB24 extended release tablet, TAKE ONE TABLET BY MOUTH DAILY, Disp: 30 tablet, Rfl: 11    albuterol sulfate HFA (PROAIR HFA) 108 (90 Base) MCG/ACT inhaler, Inhale 2 puffs into the lungs every 6 hours as needed for Wheezing or Shortness of Breath, Disp: 1 each, Rfl: 5    Allergies: Allergies   Allergen Reactions    Latex Hives and Nausea And Vomiting    Aspirin      Tachycardia, anxiety       Vitals:    22 0856   BP: 119/82   Pulse: 77   Weight: 165 lb (74.8 kg)   Height: 5' 5\" (1.651 m)       Exam:  Neurovascular status unchanged. Surgical site healing without issues noted plantar left forefoot region. No signs of infection noted left foot. Adequate range of motion digital regions left foot. Stable gait noted upon evaluation. Diagnostic Studies:     No results found. Procedures:    None    Plan Per Assessment  Ale Stallings was seen today for post-op check. Diagnoses and all orders for this visit:    Benign neoplasm of skin of left foot      1. Postoperative evaluation and management  2. Surgical site evaluated left foot, dry dressing reapplied. 3. She was to increase her daily activities to tolerance.   4. Patient will be followed up at a later date for continued evaluation and management. Seen By:    Joe Ham DPM    Electronically signed by Joe Ham DPM on 5/24/2022 at 9:21 AM    This note was created using voice recognition software. The note was reviewed however may contain grammatical errors. spoke to , resident on [facility] last taking care of patient

## 2022-08-17 LAB
CHOLESTEROL, TOTAL: 254 MG/DL (ref 0–199)
GLUCOSE BLD-MCNC: 90 MG/DL (ref 74–107)
HDLC SERPL-MCNC: 53 MG/DL
LDL CHOLESTEROL CALCULATED: 176 MG/DL (ref 0–99)
TRIGL SERPL-MCNC: 127 MG/DL (ref 0–149)

## 2022-09-13 DIAGNOSIS — M25.542 ARTHRALGIA OF BOTH HANDS: Primary | ICD-10-CM

## 2022-09-13 DIAGNOSIS — F32.A DEPRESSION, UNSPECIFIED DEPRESSION TYPE: ICD-10-CM

## 2022-09-13 DIAGNOSIS — F41.1 GAD (GENERALIZED ANXIETY DISORDER): ICD-10-CM

## 2022-09-13 DIAGNOSIS — M25.541 ARTHRALGIA OF BOTH HANDS: Primary | ICD-10-CM

## 2022-09-16 ENCOUNTER — HOSPITAL ENCOUNTER (OUTPATIENT)
Age: 32
Discharge: HOME | End: 2022-09-16
Payer: COMMERCIAL

## 2022-09-16 DIAGNOSIS — M25.542 ARTHRALGIA OF BOTH HANDS: ICD-10-CM

## 2022-09-16 DIAGNOSIS — M25.541 ARTHRALGIA OF BOTH HANDS: ICD-10-CM

## 2022-09-16 DIAGNOSIS — F41.1 GAD (GENERALIZED ANXIETY DISORDER): ICD-10-CM

## 2022-09-16 DIAGNOSIS — F32.A DEPRESSION, UNSPECIFIED DEPRESSION TYPE: ICD-10-CM

## 2022-09-16 LAB
ALBUMIN SERPL-MCNC: 4.5 G/DL (ref 3.5–5.2)
ALP BLD-CCNC: 63 U/L (ref 35–104)
ALT SERPL-CCNC: 15 U/L (ref 0–32)
ANION GAP SERPL CALCULATED.3IONS-SCNC: 13 MMOL/L (ref 7–16)
AST SERPL-CCNC: 18 U/L (ref 0–31)
BASOPHILS ABSOLUTE: 0.04 E9/L (ref 0–0.2)
BASOPHILS RELATIVE PERCENT: 0.7 % (ref 0–2)
BILIRUB SERPL-MCNC: 0.8 MG/DL (ref 0–1.2)
BUN BLDV-MCNC: 9 MG/DL (ref 6–20)
C-REACTIVE PROTEIN: 0.4 MG/DL (ref 0–0.4)
C3 COMPLEMENT: 151 MG/DL (ref 90–180)
C4 COMPLEMENT: 30 MG/DL (ref 10–40)
CALCIUM SERPL-MCNC: 9.6 MG/DL (ref 8.6–10.2)
CHLORIDE BLD-SCNC: 105 MMOL/L (ref 98–107)
CO2: 22 MMOL/L (ref 22–29)
CREAT SERPL-MCNC: 0.9 MG/DL (ref 0.5–1)
EOSINOPHILS ABSOLUTE: 0.06 E9/L (ref 0.05–0.5)
EOSINOPHILS RELATIVE PERCENT: 1.1 % (ref 0–6)
GFR AFRICAN AMERICAN: >60
GFR NON-AFRICAN AMERICAN: >60 ML/MIN/1.73
GLUCOSE BLD-MCNC: 94 MG/DL (ref 74–99)
HCT VFR BLD CALC: 39.2 % (ref 34–48)
HEMOGLOBIN: 13.6 G/DL (ref 11.5–15.5)
IMMATURE GRANULOCYTES #: 0.01 E9/L
IMMATURE GRANULOCYTES %: 0.2 % (ref 0–5)
LYMPHOCYTES ABSOLUTE: 2.1 E9/L (ref 1.5–4)
LYMPHOCYTES RELATIVE PERCENT: 37.5 % (ref 20–42)
MCH RBC QN AUTO: 31 PG (ref 26–35)
MCHC RBC AUTO-ENTMCNC: 34.7 % (ref 32–34.5)
MCV RBC AUTO: 89.3 FL (ref 80–99.9)
MONOCYTES ABSOLUTE: 0.27 E9/L (ref 0.1–0.95)
MONOCYTES RELATIVE PERCENT: 4.8 % (ref 2–12)
NEUTROPHILS ABSOLUTE: 3.12 E9/L (ref 1.8–7.3)
NEUTROPHILS RELATIVE PERCENT: 55.7 % (ref 43–80)
PDW BLD-RTO: 12.3 FL (ref 11.5–15)
PLATELET # BLD: 294 E9/L (ref 130–450)
PMV BLD AUTO: 10.7 FL (ref 7–12)
POTASSIUM SERPL-SCNC: 3.7 MMOL/L (ref 3.5–5)
RBC # BLD: 4.39 E12/L (ref 3.5–5.5)
RHEUMATOID FACTOR: <10 IU/ML (ref 0–13)
SEDIMENTATION RATE, ERYTHROCYTE: 24 MM/HR (ref 0–20)
SODIUM BLD-SCNC: 140 MMOL/L (ref 132–146)
TOTAL PROTEIN: 7.3 G/DL (ref 6.4–8.3)
TSH SERPL DL<=0.05 MIU/L-ACNC: 2.41 UIU/ML (ref 0.27–4.2)
WBC # BLD: 5.6 E9/L (ref 4.5–11.5)

## 2022-09-16 PROCEDURE — 86038 ANTINUCLEAR ANTIBODIES: CPT

## 2022-09-16 PROCEDURE — 84443 ASSAY THYROID STIM HORMONE: CPT

## 2022-09-16 PROCEDURE — 80053 COMPREHEN METABOLIC PANEL: CPT

## 2022-09-16 PROCEDURE — 85025 COMPLETE CBC W/AUTO DIFF WBC: CPT

## 2022-09-16 PROCEDURE — 86431 RHEUMATOID FACTOR QUANT: CPT

## 2022-09-16 PROCEDURE — 36415 COLL VENOUS BLD VENIPUNCTURE: CPT

## 2022-09-16 PROCEDURE — 86140 C-REACTIVE PROTEIN: CPT

## 2022-09-16 PROCEDURE — 86160 COMPLEMENT ANTIGEN: CPT

## 2022-09-16 PROCEDURE — 86200 CCP ANTIBODY: CPT

## 2022-09-16 PROCEDURE — 85651 RBC SED RATE NONAUTOMATED: CPT

## 2022-09-19 LAB — ANTI-NUCLEAR ANTIBODY (ANA): NEGATIVE

## 2022-09-21 LAB — CYCLIC CITRULLINATED PEPTIDE ANTIBODY IGG: 1 U/ML (ref 0–7)

## 2023-11-07 NOTE — PROGRESS NOTES
Karen message sent with the information below. Will monitor that the message is read or that the patient calls back.     Patient is in today for 1 week post op of left foot benign neoplasm removal. Patient is not having any issues or pain at this time.  pcp is Chula Rousseau DO  Last ov 5/11/22

## (undated) DEVICE — CHLORAPREP 26ML ORANGE

## (undated) DEVICE — GLOVE SURG SZ 85 L12IN FNGR THK94MIL STD WHT LTX FREE

## (undated) DEVICE — HANDLE CVR PATENTED RETENTION DISC STRL LIGHT SHLD

## (undated) DEVICE — STANDARD SURGICAL GOWN, L: Brand: CONVERTORS

## (undated) DEVICE — NEEDLE HYPO 18GA L1.5IN PNK POLYPR HUB S STL THN WALL FILL

## (undated) DEVICE — STANDARD HYPODERMIC NEEDLE,POLYPROPYLENE HUB: Brand: MONOJECT

## (undated) DEVICE — GAUZE,SPONGE,4"X4",8PLY,STRL,LF,10/TRAY: Brand: MEDLINE

## (undated) DEVICE — TOWEL,OR,DSP,ST,BLUE,STD,6/PK,12PK/CS: Brand: MEDLINE

## (undated) DEVICE — CLOTH SKIN PREP 2% CHG

## (undated) DEVICE — E-Z CLEAN, NON-STICK, PTFE COATED, ELECTROSURGICAL NEEDLE ELECTRODE, MODIFIED EXTENDED INSULATION, 2.75 INCH (7 CM): Brand: MEGADYNE

## (undated) DEVICE — 1810 FOAM BLOCK NEEDLE COUNTER: Brand: DEVON

## (undated) DEVICE — TIBURON EXTREMITY SHEET: Brand: CONVERTORS

## (undated) DEVICE — ELECTRODE PT RET AD L9FT HI MOIST COND ADH HYDRGEL CORDED

## (undated) DEVICE — GOWN,SIRUS,FABRNF,XL,20/CS: Brand: MEDLINE

## (undated) DEVICE — PEN: MARKING STD 100/CS: Brand: MEDICAL ACTION INDUSTRIES

## (undated) DEVICE — MASTISOL ADHESIVE LIQ 2/3ML

## (undated) DEVICE — SYRINGE, LUER LOCK, 10ML: Brand: MEDLINE

## (undated) DEVICE — DRESSING PETRO W3XL8IN OIL EMUL N ADH GZ KNIT IMPREG CELOS

## (undated) DEVICE — MARKER,SKIN,WI/RULER AND LABELS: Brand: MEDLINE

## (undated) DEVICE — 4-PORT MANIFOLD: Brand: NEPTUNE 2

## (undated) DEVICE — INTENDED FOR TISSUE SEPARATION, AND OTHER PROCEDURES THAT REQUIRE A SHARP SURGICAL BLADE TO PUNCTURE OR CUT.: Brand: BARD-PARKER ® STAINLESS STEEL BLADES

## (undated) DEVICE — DOUBLE BASIN SET: Brand: MEDLINE INDUSTRIES, INC.

## (undated) DEVICE — CLOTH SURG PREP PREOPERATIVE CHLORHEXIDINE GLUC 2% READYPREP

## (undated) DEVICE — 3M™ STERI-STRIP™ REINFORCED ADHESIVE SKIN CLOSURES, R1541, 1/4 IN X 3 IN (6 MM X 75 MM), 3 STRIPS/ENVELOPE: Brand: 3M™ STERI-STRIP™

## (undated) DEVICE — SYRINGE,EAR/ULCER, 2 OZ, STERILE: Brand: MEDLINE

## (undated) DEVICE — GLOVE ORANGE PI 7   MSG9070

## (undated) DEVICE — NEPTUNE E-SEP SMOKE EVACUATION PENCIL, COATED, 70MM BLADE, PUSH BUTTON SWITCH: Brand: NEPTUNE E-SEP